# Patient Record
Sex: FEMALE | Race: WHITE
[De-identification: names, ages, dates, MRNs, and addresses within clinical notes are randomized per-mention and may not be internally consistent; named-entity substitution may affect disease eponyms.]

---

## 2019-06-11 ENCOUNTER — HOSPITAL ENCOUNTER (INPATIENT)
Dept: HOSPITAL 92 - L&D | Age: 24
LOS: 2 days | Discharge: HOME | End: 2019-06-13
Attending: FAMILY MEDICINE | Admitting: FAMILY MEDICINE
Payer: COMMERCIAL

## 2019-06-11 VITALS — BODY MASS INDEX: 25.1 KG/M2

## 2019-06-11 DIAGNOSIS — Z40.03: ICD-10-CM

## 2019-06-11 DIAGNOSIS — Z80.41: ICD-10-CM

## 2019-06-11 DIAGNOSIS — O34.211: Primary | ICD-10-CM

## 2019-06-11 DIAGNOSIS — Z3A.39: ICD-10-CM

## 2019-06-11 LAB
HBSAG INDEX: 0.32 S/CO (ref 0–0.99)
HGB BLD-MCNC: 12.4 G/DL (ref 12–16)
MCH RBC QN AUTO: 30 PG (ref 27–31)
MCV RBC AUTO: 88.4 FL (ref 78–98)
PLATELET # BLD AUTO: 190 THOU/UL (ref 130–400)
RBC # BLD AUTO: 4.13 MILL/UL (ref 4.2–5.4)
SYPHILIS ANTIBODY INDEX: 0.04 S/CO
WBC # BLD AUTO: 7.4 THOU/UL (ref 4.8–10.8)

## 2019-06-11 PROCEDURE — 86850 RBC ANTIBODY SCREEN: CPT

## 2019-06-11 PROCEDURE — 36415 COLL VENOUS BLD VENIPUNCTURE: CPT

## 2019-06-11 PROCEDURE — 88302 TISSUE EXAM BY PATHOLOGIST: CPT

## 2019-06-11 PROCEDURE — 85027 COMPLETE CBC AUTOMATED: CPT

## 2019-06-11 PROCEDURE — 86900 BLOOD TYPING SEROLOGIC ABO: CPT

## 2019-06-11 PROCEDURE — 86901 BLOOD TYPING SEROLOGIC RH(D): CPT

## 2019-06-11 PROCEDURE — 87340 HEPATITIS B SURFACE AG IA: CPT

## 2019-06-11 PROCEDURE — 51702 INSERT TEMP BLADDER CATH: CPT

## 2019-06-11 PROCEDURE — 86780 TREPONEMA PALLIDUM: CPT

## 2019-06-11 PROCEDURE — 90715 TDAP VACCINE 7 YRS/> IM: CPT

## 2019-06-11 NOTE — OP
DATE OF PROCEDURE:  2019



PREOPERATIVE DIAGNOSIS:  Family history of ovarian cancer, requests risk 
reducing

salpingectomy at time of repeat . 



POSTOPERATIVE DIAGNOSIS:  Family history of ovarian cancer, requests risk 
reducing

salpingectomy at time of repeat . 



PROCEDURE PERFORMED:  Bilateral salpingectomies at time of .



SURGEON: Vitaly Wilkinson MD



ASSISTANT SURGEON:  Lily Ferrer MD



ANESTHESIA:  Spinal.



ESTIMATED BLOOD LOSS:  Minimal.



COMPLICATIONS:  None.



TECHNIQUE IN DETAIL:  This is a patient, who has been followed in [facility]

Clinic and had been consented for risk reducing salpingectomy due to a family

history.  Her baby had just been delivered by  section by the [facility] personnel and the hysterotomy has been closed.  I scrubbed in, and both

adnexa were within normal limits bilaterally.  Starting on the left side, the 
left

tube was removed by way of serial clamping, excising, and suture ligating of the

mesial salpinx.  This was carried up to the insertion of the uterus.  Good

hemostasis was noted along the left side.  Attention was turned to the opposite

side, where similar procedure was performed.  Good hemostasis was noted across 
all

pedicles.  The case was then returned to the family practice doctors for the 
closure

of the abdominal cavity. 







Job ID:  [id number]



[facility]

## 2019-06-11 NOTE — PDOC.OPDEL
OB Operative/Delivery Note


Delivery Dr/Surgeon: Dr. Ferrer with Dr. Church attending. Dr. Wilkinson was 

surgeon for RRS


Assist: Dr. Gil


Pre-Delivery Diagnosis: scheduled  section


Procedure/Post Delivery Dx: repeat low transverse CS


Weeks gestation: 39 (39w3d)


Anesthesia: spinal





- Findings


  ** A


Sex: male


Apgar - 1 min: 8


Apgar - 5 min: 9





- Additional Findings/Plan


Placenta delivered: manual removal


 findings: low transverse hysterotomy without extension


Estimated blood loss: 860mL


Compilations/Other Findings: 


Preoperative Diagnosis: 


1)Term intrauterine pregnancy


2)Previous 


3)Family history of ovarian cancer





Postoperative Diagnosis:  


1)Term intrauterine pregnancy, delivered


2)Previous 


3)Family history of ovarian cancer





Anesthesia: spinal





Indications: The patient is a 23 year old  female at 39.3 weeks 

gestation who presents for a repeat scheduled  and risk reducing 

salpingectomy. 





Procedure in Detail: After risks, benefits, and alternatives were explained to 

the patient, she gave informed consent.  Pre-operative antibiotics included 

Cefazolin 2 gram IV.  The patient was taken to the operating room and spinal 

anesthesia was initiated.  She was placed in the supine position with a left 

tilt and prepped and draped in usual sterile fashion.  A Pfannenstiel incision 

was made with a scalpel and carried down to the level of the fascia which was 

sharply nicked.  The fascial cut was extended bilaterally with Brown scissors.  

The inferior and superior edges of the cut fascial edges were elevated with 

Kocher clamps and the underlying rectus muscles were sharply and bluntly 

dissected free.  The recti were divided using two hemostats and Metzembaum 

scissors and retracted manually.  The peritoneum was entered bluntly and 

retracted manually. An sebastián O retractor was placed. A low transverse score 

was made with the scalpel and the uterus was entered in the midline bluntly.  

Clear fluid was seen.  The hysterotomy was extended manually in a cephalocaudal 

fashion.  The infant was noted to be vertex and was easily delivered by fundal 

pressure.  Mouth and nares were bulb suctioned.  Cord clamped and cut and 

grossly normal male infant was handed to waiting nurse.  Cord blood was 

obtained.  Placenta was manually extracted, found to be intact with 3 vessel 

cord and discarded.  The uterus was externalized and the endometrium was 

curetted with a dry lap.  The uterus was closed with a running locking 0-

Monocryl. There were a few bleeding areas on the serosal edge that were 

cauterized with the bovie.  Following this hemostasis was noted. The risk 

reducing salpingectomy was performed at this point by Dr. Wilkinson, see his 

operative note for full details. The uterus was internalized and the 

hysterotomy was again noted to be hemostatic.  The rectus muscle was examined 

and found to have a few bleeding areas that were cauterized with the bovie. The 

fascia was closed with a running non-locking 0-PDS suture.  The subcutaneous 

tissue was irrigated and the bleeders were cauterized with the bovie. The 

subcutaneous tissue was approximated with interrupted 2-0 plaingut.  The skin 

was closed with 3-0 monocryl and dermabond. A dressing was placed.  All counts 

were correct.  The patient tolerated the procedure well and was taken to the 

recovery room in stable condition.  





Quantitative Blood Loss: 860 ml


Complications: None


Specimens: Cord blood sent to lab for blood type. Bilateral grossly normal 

fallopian tubes sent to lab


Findings: Grossly normal male infant with apgars of 8 and 9 at 1 and 5 minutes 

respectively.  Grossly normal placenta with 3 vessel cord discarded. 


Drains:  Colon to gravity draining clear urine








Post delivery plan: routine recovery





Addendum - Attending





- Attending Attestation


Date/Time: 19 5792


I was present for and assisted in the entire  delivery performed by Gisselle Ferrer and Jeffery. I agree with documentation as above. 


See Dr Wilkinson's operative report for details of salpingectomy.

## 2019-06-11 NOTE — PDOC.EVN
Event Note





- Event Note


Event Note: 





Note from 4 hour post-op check @ 1630





Patient laying in bed, holding baby in PP room. She denies pain at this time. 

She would like to try some broth. Colon in place with Uop of 300mL since on the 

floor. No excessive vaginal bleeding. Fundus is below umbilicus and 

appropriately TTP. Vital signs stable. Bandage clean, dry, intact. She has no 

concerns. Continue routine postpartum care.

## 2019-06-11 NOTE — PDOC.FPROB
FMR OB H&P: HPI





- History of Present Illness


Chief Complaint: scheduled repeat 


Indentification: 22 y/o  @ 39.3 WGA by 12.5 wk sono here for scheduled 




History of Present Illness: 


Patient denies any complaints today. Denies vaginal bleeding, ctx, vaginal d/c, 

LOF. 


She endorses good fetal movement. 


Primary Care Physician: 


[facility]





FMR OB H&P: Current Pregnancy





- Prenatal Care


: 3


Para: 1102


Gestational age: 39w3d


Due date: 6/15/19


Dating Criteria: 12w5d sono





- OB Labs


Blood type: O


RH: positive


Antibody Screen: negative


HIV: negative


RPR: negative


HepBsAg: negative


Rubella: immune


Pap Smear: NILM





FMR OB H&P: History





- Past Medical History


PMH: 


Denies





- OB History


OB History: 


Prior  at 32 weeks for oligo and failed IOL


Scheduled term repeat  for short interpregnancy interval





- GYN History


GYN History: 


Denies any h/o STI's or abnormal paps





- Surgical History


Sx History: 


 section x 2





- Social History


Social History: 


Denies any tobacco, EtOH, or drug use





- Family History


Family History: 


Family hx of ovarian cancer in mother and maternal cousin





FMR OB H&P: Medications





- Current


Home Medications: 


 











 Medication  Instructions  Recorded  Confirmed  Type


 


Prenatal 105/Iron/Folic AC/Dha 1 tab PO DAILY 19 History





[Prena1 True Combo Pack]    











Allergies/Adverse Reactions: 


 Allergies











Allergy/AdvReac Type Severity Reaction Status Date / Time


 


No Known Allergies Allergy   Unverified 19 10:51














FMR OB H&P: ROS





- Review of Systems


General: denies: fever/chills, recent trauma


ENT: denies: nasal congestion, rhinorrhea


Cardiovascular: denies: chest pain, edema


Respiratory: denies: cough, shortness of breath


Gastrointestinal: reports: indigestion.  denies: abdominal pain, diarrhea


Genitourinary (Female): denies: dysuria, hematuria


Musculoskeletal: denies: pain, tenderness


Neurologic: denies: numbness, weakness


Integumentary: denies: itching, rash


Hematologic/Lymphatic: denies: prolonged or excessive bleeding


Psychological: denies: depression, anxiety





FMR OB H&P: Vital Signs





- Maternal


Vital signs: 


/61 HR 82 RR 16 O2 sat 99% on RA





- Fetal Heart Tones


Baseline: 125


Variability: moderate


Acceleration: present


Deceleration: absent


Category: category 1


[place] contractions every: none





FMR OB H&P: Physical Exam





- Physical Exam


General: NAD, awake, alert and oriented


HEENT: normocephalic and atraumatic, MMM, grossly normal vision, grossly normal 

hearing


Neck: supple, no LAD


Heart: RRR, normal S1/S2, no murmurs/rubs/gallops, pulses present, no edema


General: CTAB, no respiratory distress, good air movement, no rales/rhonchi, no 

wheezing


Abdomen: soft, gravid, non-tender, bowel sound present


Musculoskeletal: normal gait and station


Neurological: cranial nerves II through XII intact, no focal deficit


Skin: good tugor, capillary refill <2 seconds


Lymphatic: no unusual bruising or bleeding, no purpura


Psychiatric: intact recent and remote memory, good judgement and insight





FMR OB H&P: Results





- Imaging


Imaging: 


Bedside US showed cephalic position and posterior fundal placenta





FMR OB H&P: A/P





- Problem List


(1) Term pregnancy


Status: Acute   Code(s): Z34.90 - ENCNTR FOR SUPRVSN OF NORMAL PREGNANCY, UNSP, 

UNSP TRIMESTER   


Assessment and Plan: 


Plan for repeat  today


-NPO


-LR @ 125


-Anesthesia consulted for spinal


-fetal monitoring


-Ancef 2g








(2) H/O  section


Status: Acute   Code(s): Z98.891 - HISTORY OF UTERINE SCAR FROM PREVIOUS 

SURGERY   


Assessment and Plan: 


Two prior  sections


-Plan for repeat today








(3) Family history of ovarian cancer


Status: Acute   Code(s): Z80.41 - FAMILY HISTORY OF MALIGNANT NEOPLASM OF OVARY

   


Assessment and Plan: 


Patient with history of first degree relative with ovarian cancer. 


-Has been approved for risk reducing salpingectomy, so will perform with 

 today


-Dr. Wilkinson has been consulted for risk reducing salpingectomy. 





Disposition: 


Admit to L&D


Discussion: 


Date/Time: 19 0701











This H&P was discussed with Dr. Church who agrees with the above documentation 

and plan.





Signature: 


Lily Ferrer MD, PGY-2





Addendum - Attending





- Attending Attestation


Date/Time: 19 1554





I personally evaluated the patient and discussed the management with Dr. Ferrer


I agree with the History, Examination, Assessment and Plan documented above 

with any addition or exceptions noted below.


22 y/o  @ 39.3 WGA by 12.5 wk sono here for scheduled  with 

salpingectomy for ovarian cancer risk reduction. 


Pt was counseled on r/b/a of repeat  section/salpingectomy. She 

verbalized understanding of these risks and desire to proceed with both 

procedures.


Pt accidentally signed the incorrect area of the surgical consent but she 

verbally gave informed consent for salpingectomy. Additionally she signed 

consent with [facility]

## 2019-06-12 LAB
HGB BLD-MCNC: 11.2 G/DL (ref 12–16)
MCH RBC QN AUTO: 30.2 PG (ref 27–31)
MCV RBC AUTO: 89.5 FL (ref 78–98)
PLATELET # BLD AUTO: 175 THOU/UL (ref 130–400)
RBC # BLD AUTO: 3.72 MILL/UL (ref 4.2–5.4)
WBC # BLD AUTO: 11.4 THOU/UL (ref 4.8–10.8)

## 2019-06-12 PROCEDURE — 0UT70ZZ RESECTION OF BILATERAL FALLOPIAN TUBES, OPEN APPROACH: ICD-10-PCS | Performed by: OBSTETRICS & GYNECOLOGY

## 2019-06-12 RX ADMIN — DOCUSATE CALCIUM SCH MG: 240 CAPSULE, LIQUID FILLED ORAL at 21:30

## 2019-06-12 RX ADMIN — HYDROCODONE BITARTRATE AND ACETAMINOPHEN PRN TAB: 5; 325 TABLET ORAL at 17:46

## 2019-06-12 RX ADMIN — DOCUSATE CALCIUM SCH MG: 240 CAPSULE, LIQUID FILLED ORAL at 14:07

## 2019-06-12 RX ADMIN — HYDROCODONE BITARTRATE AND ACETAMINOPHEN PRN TAB: 5; 325 TABLET ORAL at 21:31

## 2019-06-12 RX ADMIN — HYDROCODONE BITARTRATE AND ACETAMINOPHEN PRN TAB: 5; 325 TABLET ORAL at 11:03

## 2019-06-12 RX ADMIN — DOCUSATE CALCIUM SCH: 240 CAPSULE, LIQUID FILLED ORAL at 01:35

## 2019-06-12 NOTE — PDOC.OBPPN
FMR OB Postpartum PN: Subj





- Interval History


Hospital Day: 2


Postpartum Day: 


1


Patient reports her pain is well controlled. She has been ambulating to the 

bathroom and voiding and passing flatus. She reports minimal lochia. She is 

tolerating PO. Denies N/V, chest pain, vision changes, headaches. She is breast 

feeding without difficulty. 





FMR OB Postpartum PN: Obj





- Maternal


Vital signs: 


BP: 98/54  HR: 70 RR: 18 Tmax: 98.1 Pox: 96% on RA  Wt: 64kg   








- Urine output


I&O: 


 











 06/10/19 06/11/19 06/12/19





 06:59 06:59 06:59


 


Intake Total   755


 


Output Total   1400


 


Balance   -645














FMR OB Postpartum PN: Exam





- Physical Exam


General: NAD, awake, alert and oriented


HEENT: EOMI, MMM, grossly normal vision, grossly normal hearing


Neck: supple, no LAD


Heart: RRR, normal S1/S2, no murmurs/rubs/gallops, pulses present, no edema


General: CTAB, no respiratory distress, good air movement, no rales/rhonchi, no 

wheezing


Abdomen: soft, fundus(cm) (2 cm below umbilicus)


Neurological: no clonus, no focal deficit


Skin: good tugor, capillary refill <2 seconds


Postpartum: incision healing well, no erythema, no edema, no drainage


Lymphatic: no unusual bruising or bleeding, no purpura


Psychiatric: intact recent and remote memory, good judgement and insight





FMR OB Postpartum PN: Data





- Labs


Lab results: 


 Laboratory Results - last 24 hr











  19





  10:57 10:57 10:57


 


WBC   


 


RBC   


 


Hgb   


 


Hct   


 


MCV   


 


MCH   


 


MCHC   


 


RDW   


 


Plt Count   


 


MPV   


 


Syphilis IgG/IgM Ab   Nonreactive 


 


Hep Bs Antigen  Non-Reactive  


 


Blood Type    O POSITIVE


 


Antibody Screen    NEGATIVE














  19





  10:57 11:34


 


WBC  7.4 


 


RBC  4.13 L 


 


Hgb  12.4 


 


Hct  36.5 


 


MCV  88.4 


 


MCH  30.0 


 


MCHC  34.0 


 


RDW  14.4 


 


Plt Count  190 


 


MPV  9.0 


 


Syphilis IgG/IgM Ab  


 


Hep Bs Antigen  


 


Blood Type   O POSITIVE


 


Antibody Screen  














FMR OB Postpartum PN: A/P





- Problem List


(1) Term pregnancy delivered


Current Visit: Yes   Status: Acute   Code(s): O80 - ENCOUNTER FOR FULL-TERM 

UNCOMPLICATED DELIVERY   


Assessment and Plan: 


Patient s/p rLTCS with RRS


-Continue routine postpartum care


-Advance diet as tolerated


-Encourage ambulation


-Encourage breast feeding


-PNV


-AM H/H pending


-Pain controlled with toradol, will transition to PO pain meds today








(2) H/O  section


Current Visit: No   Status: Acute   Code(s): Z98.891 - HISTORY OF UTERINE SCAR 

FROM PREVIOUS SURGERY   


Assessment and Plan: 


PPD#1 s/p rLTCS with RRS


-Patient with two priors











(3) Family history of ovarian cancer


Current Visit: No   Status: Acute   Code(s): Z80.41 - FAMILY HISTORY OF 

MALIGNANT NEOPLASM OF OVARY   


Assessment and Plan: 


First degree relative with ovarian cancer. Patient s/p risk reducing 

salpingectomy on 19





Disposition: 


Continue to monitor on post-partum


Discussion: 


Date/Time: 19 0623











This H&P was discussed with Dr. Church who agrees with the above documentation 

and plan.





Signature: 


Lily Ferrer MD, PGY-2





Addendum - Attending





- Attending Attestation


Date/Time: 19 1413





I personally evaluated the patient and discussed the management with Dr. Ferrer


I agree with the History, Examination, Assessment and Plan documented above 

with any addition or exceptions noted below.


Stable POD #1 s/p RLTCS with RRS


Meeting appropriate postpartum milestones. 


Drop in Hgb appropriate postoperatively.


Overall doing well. Anticipate d/c to home on POD #2 or 3.

## 2019-06-13 VITALS — SYSTOLIC BLOOD PRESSURE: 110 MMHG | DIASTOLIC BLOOD PRESSURE: 74 MMHG | TEMPERATURE: 97.6 F

## 2019-06-13 RX ADMIN — DOCUSATE CALCIUM SCH MG: 240 CAPSULE, LIQUID FILLED ORAL at 09:05

## 2019-06-13 RX ADMIN — HYDROCODONE BITARTRATE AND ACETAMINOPHEN PRN TAB: 5; 325 TABLET ORAL at 02:20

## 2019-06-13 RX ADMIN — HYDROCODONE BITARTRATE AND ACETAMINOPHEN PRN TAB: 5; 325 TABLET ORAL at 09:05

## 2019-06-13 NOTE — PDOC.OBPPN
FMR OB Postpartum PN: Subj





- Interval History


Hospital Day: 3


Postpartum Day: 


2


Patient reports her pain is well controlled, but she is requiring norco 

regularly. She has been ambulating, voiding, and passing flatus. She reports 

having a BM last night. She reports minimal lochia. She is tolerating PO. 

Denies N/V, chest pain, vision changes, headaches. She is breast feeding 

without difficulty. 





FMR OB Postpartum PN: Obj





- Maternal


Vital signs: 


BP: 113/71  HR: 80 RR: 18 Tmax: 98.6 Pox: 97% on RA  Wt: 64kg   








- Urine output


I&O: 


 











 19





 06:59 06:59 06:59


 


Intake Total 2324  


 


Output Total 2800  


 


Balance -476  














FMR OB Postpartum PN: Exam





- Physical Exam


General: NAD, awake, alert and oriented


HEENT: EOMI, MMM, grossly normal vision, grossly normal hearing


Neck: supple, no LAD


Heart: RRR, normal S1/S2, no murmurs/rubs/gallops, pulses present, no edema


General: CTAB, no respiratory distress, good air movement, no rales/rhonchi, no 

wheezing


Abdomen: soft, fundus(cm) (firm 2 cm below umbilicus)


Skin: good tugor, capillary refill <2 seconds


Postpartum: incision healing well, no erythema, no edema, no drainage, 

appropriately tender


Lymphatic: no unusual bruising or bleeding, no purpura


Psychiatric: intact recent and remote memory, good judgement and insight





FMR OB Postpartum PN: Data





- Labs


Lab results: 


 Laboratory Results - last 24 hr











  19





  06:49


 


WBC  11.4 H


 


RBC  3.72 L


 


Hgb  11.2 L


 


Hct  33.3 L


 


MCV  89.5


 


MCH  30.2


 


MCHC  33.7


 


RDW  14.5


 


Plt Count  175


 


MPV  9.0














FMR OB Postpartum PN: A/P





- Problem List


(1) Term pregnancy delivered


Status: Acute   Code(s): O80 - ENCOUNTER FOR FULL-TERM UNCOMPLICATED DELIVERY   


Assessment and Plan: 


Patient s/p rLTCS with RRS


-Continue routine postpartum care


-Continue regular diet


-Encourage ambulation


-Encourage breast feeding


-PNV


-Pain controlled with norco prn and ibuprofen scheduled








(2) H/O  section


Status: Acute   Code(s): Z98.891 - HISTORY OF UTERINE SCAR FROM PREVIOUS 

SURGERY   


Assessment and Plan: 


Patient POD#2 now with 3  sections








(3) Family history of ovarian cancer


Status: Acute   Code(s): Z80.41 - FAMILY HISTORY OF MALIGNANT NEOPLASM OF OVARY

   


Assessment and Plan: 


First degree relative with ovarian cancer. Patient s/p risk reducing 

salpingectomy on 19





Disposition: 


Anticipate d/c home today pending pain control


Discussion: 


Date/Time: 19 0703











This H&P was discussed with Dr. Church who agrees with the above documentation 

and plan.





Signature: 


Lily Ferrer MD, PGY-2





Addendum - Attending





- Attending Attestation


Date/Time: 19 1625





I personally evaluated the patient and discussed the management with Dr. Ferrer


I agree with the History, Examination, Assessment and Plan documented above 

with any addition or exceptions noted below.


Stable POD # 2 s/p RLTCS with RRS. 


Pt meeting appropriate postoperative milestones and pt is requesting early 

discharge. 


Stable for d/c today